# Patient Record
Sex: MALE | Race: ASIAN | NOT HISPANIC OR LATINO
[De-identification: names, ages, dates, MRNs, and addresses within clinical notes are randomized per-mention and may not be internally consistent; named-entity substitution may affect disease eponyms.]

---

## 2021-09-22 PROBLEM — Z00.00 ENCOUNTER FOR PREVENTIVE HEALTH EXAMINATION: Status: ACTIVE | Noted: 2021-09-22

## 2021-09-23 ENCOUNTER — APPOINTMENT (OUTPATIENT)
Dept: OTOLARYNGOLOGY | Facility: CLINIC | Age: 41
End: 2021-09-23
Payer: COMMERCIAL

## 2021-09-23 VITALS
TEMPERATURE: 97.7 F | HEART RATE: 85 BPM | DIASTOLIC BLOOD PRESSURE: 88 MMHG | SYSTOLIC BLOOD PRESSURE: 135 MMHG | OXYGEN SATURATION: 97 %

## 2021-09-23 DIAGNOSIS — J03.90 ACUTE TONSILLITIS, UNSPECIFIED: ICD-10-CM

## 2021-09-23 DIAGNOSIS — J34.2 DEVIATED NASAL SEPTUM: ICD-10-CM

## 2021-09-23 DIAGNOSIS — R07.0 PAIN IN THROAT: ICD-10-CM

## 2021-09-23 DIAGNOSIS — J34.3 HYPERTROPHY OF NASAL TURBINATES: ICD-10-CM

## 2021-09-23 PROCEDURE — 99203 OFFICE O/P NEW LOW 30 MIN: CPT | Mod: 25

## 2021-09-23 PROCEDURE — 31575 DIAGNOSTIC LARYNGOSCOPY: CPT

## 2021-09-23 NOTE — REVIEW OF SYSTEMS
[Patient Intake Form Reviewed] : Patient intake form was reviewed [Nasal Congestion] : nasal congestion [Problem Snoring] : problem snoring [As Noted in HPI] : as noted in HPI [Throat Pain] : throat pain [Throat Dryness] : throat dryness [Negative] : Heme/Lymph

## 2021-09-28 NOTE — REASON FOR VISIT
[Initial Consultation] : an initial consultation for [FreeTextEntry2] : Bilateral tonsil pain and nasal obstruction for the past couple of years.  Patient states his level of severity is a level 10 out of 10 and it occurs constant.  Patient states nothing helps to improve or worsens his Bilateral tonsil pain and nasal obstruction for the past couple of years.

## 2021-09-28 NOTE — CONSULT LETTER
[Dear  ___] : Dear  [unfilled], [Consult Letter:] : I had the pleasure of evaluating your patient, [unfilled]. [Please see my note below.] : Please see my note below. [Consult Closing:] : Thank you very much for allowing me to participate in the care of this patient.  If you have any questions, please do not hesitate to contact me. [Sincerely,] : Sincerely, [DrFrancoise  ___] : Dr. DUBOIS [FreeTextEntry3] : Octavio Chavez MD, FACS\par Professor of Otolaryngology, [facility] [employer] [facility]\par Director, [facility], Department of Otolaryngology, [facility]\par , Head & Neck Service Line, [facility]\par

## 2021-09-28 NOTE — HISTORY OF PRESENT ILLNESS
[de-identified] : 40 years old male patient with history of Bilateral tonsil pain and nasal obstruction for the past couple of years.    Patient  is present today in the office at the request of Dr. Tam for consultation and evaluation of Bilateral Enlarged Tonsils.  Deviated Nasal Septum.  Turbinate Hypertrophy.  Patient C/O occupational snoring

## 2021-09-28 NOTE — PROCEDURE
[Congested] : congested [Deviated to the Lt] : deviated to the left [Image(s) Captured] : image(s) captured and filed [Topical Lidocaine] : topical lidocaine [Oxymetazoline HCl] : oxymetazoline HCl [Flexible Endoscope] : examined with the flexible endoscope [Serial Number: ___] : Serial Number: [unfilled] [Glottis Arytenoid Cartilages Erythema] : bilateral arytenoid ~M erythema [de-identified] :  Bilateral Enlarged Tonsils.  Deviated Nasal Septum.  Turbinate Hypertrophy.  Patient C/O occupational snoring

## 2021-09-28 NOTE — PHYSICAL EXAM
[Normal] : tympanic membranes are normal in both ears [] : septum deviated bilaterally [de-identified] : Bilateral Enlarged Tonsils.  Deviated Nasal Septum.  Turbinate Hypertrophy.  Patient C/O occupational snoring

## 2021-10-09 ENCOUNTER — APPOINTMENT (OUTPATIENT)
Dept: DISASTER EMERGENCY | Facility: CLINIC | Age: 41
End: 2021-10-09

## 2021-10-09 DIAGNOSIS — Z01.818 ENCOUNTER FOR OTHER PREPROCEDURAL EXAMINATION: ICD-10-CM

## 2021-10-10 LAB — SARS-COV-2 N GENE NPH QL NAA+PROBE: NOT DETECTED

## 2021-10-11 ENCOUNTER — TRANSCRIPTION ENCOUNTER (OUTPATIENT)
Age: 41
End: 2021-10-11

## 2021-10-11 RX ORDER — ACETAMINOPHEN AND CODEINE 300; 30 MG/1; MG/1
300-30 TABLET ORAL
Qty: 30 | Refills: 0 | Status: ACTIVE | COMMUNITY
Start: 2021-10-11 | End: 1900-01-01

## 2021-10-11 RX ORDER — LIDOCAINE HYDROCHLORIDE 20 MG/ML
2 SOLUTION ORAL; TOPICAL
Qty: 1 | Refills: 4 | Status: ACTIVE | COMMUNITY
Start: 2021-10-11 | End: 1900-01-01

## 2021-10-11 RX ORDER — DOCUSATE SODIUM 100 MG/1
100 CAPSULE ORAL TWICE DAILY
Qty: 60 | Refills: 0 | Status: ACTIVE | COMMUNITY
Start: 2021-10-11 | End: 1900-01-01

## 2021-10-11 RX ORDER — METHYLPREDNISOLONE 4 MG/1
4 TABLET ORAL
Qty: 1 | Refills: 0 | Status: ACTIVE | COMMUNITY
Start: 2021-10-11 | End: 1900-01-01

## 2021-10-11 RX ORDER — AMOXICILLIN 500 MG/1
500 CAPSULE ORAL
Qty: 14 | Refills: 0 | Status: ACTIVE | COMMUNITY
Start: 2021-10-11 | End: 1900-01-01

## 2021-10-12 ENCOUNTER — RESULT REVIEW (OUTPATIENT)
Age: 41
End: 2021-10-12

## 2021-10-12 ENCOUNTER — OUTPATIENT (OUTPATIENT)
Dept: OUTPATIENT SERVICES | Facility: HOSPITAL | Age: 41
LOS: 1 days | Discharge: HOME | End: 2021-10-12
Payer: COMMERCIAL

## 2021-10-12 ENCOUNTER — APPOINTMENT (OUTPATIENT)
Dept: OTOLARYNGOLOGY | Facility: AMBULATORY SURGERY CENTER | Age: 41
End: 2021-10-12

## 2021-10-12 PROCEDURE — 30117 REMOVAL OF INTRANASAL LESION: CPT

## 2021-10-12 PROCEDURE — 42826 REMOVAL OF TONSILS: CPT

## 2021-10-12 PROCEDURE — 88300 SURGICAL PATH GROSS: CPT | Mod: 26

## 2021-10-12 PROCEDURE — 30802 ABLATE INF TURBINATE SUBMUC: CPT

## 2021-10-12 PROCEDURE — 30520 REPAIR OF NASAL SEPTUM: CPT

## 2021-10-20 ENCOUNTER — APPOINTMENT (OUTPATIENT)
Dept: OTOLARYNGOLOGY | Facility: CLINIC | Age: 41
End: 2021-10-20
Payer: COMMERCIAL

## 2021-10-20 VITALS
HEART RATE: 85 BPM | DIASTOLIC BLOOD PRESSURE: 73 MMHG | TEMPERATURE: 97.8 F | OXYGEN SATURATION: 95 % | SYSTOLIC BLOOD PRESSURE: 111 MMHG

## 2021-10-20 PROCEDURE — 99024 POSTOP FOLLOW-UP VISIT: CPT

## 2021-10-20 PROCEDURE — 31237 NSL/SINS NDSC SURG BX POLYPC: CPT | Mod: 50,58

## 2021-10-20 NOTE — PHYSICAL EXAM
[Normal] : tympanic membranes are normal in both ears [] : septum deviated bilaterally [de-identified] : Bilateral Enlarged Tonsils.  Deviated Nasal Septum.  Turbinate Hypertrophy.  Patient C/O occupational snoring

## 2021-10-20 NOTE — REASON FOR VISIT
[Post-Operative Visit] : a post-operative visit [FreeTextEntry2] : sp nasal surgery debridement done

## 2021-10-27 LAB — SURGICAL PATHOLOGY STUDY: SIGNIFICANT CHANGE UP
